# Patient Record
Sex: FEMALE | URBAN - METROPOLITAN AREA
[De-identification: names, ages, dates, MRNs, and addresses within clinical notes are randomized per-mention and may not be internally consistent; named-entity substitution may affect disease eponyms.]

---

## 2023-12-28 ENCOUNTER — APPOINTMENT (RX ONLY)
Dept: URBAN - METROPOLITAN AREA CLINIC 120 | Facility: CLINIC | Age: 33
Setting detail: DERMATOLOGY
End: 2023-12-28

## 2023-12-28 DIAGNOSIS — L66.81 CENTRAL CENTRIFUGAL CICATRICIAL ALOPECIA: ICD-10-CM

## 2023-12-28 DIAGNOSIS — L30.8 OTHER SPECIFIED DERMATITIS: ICD-10-CM

## 2023-12-28 DIAGNOSIS — L66.8 OTHER CICATRICIAL ALOPECIA: ICD-10-CM

## 2023-12-28 PROBLEM — L65.9 NONSCARRING HAIR LOSS, UNSPECIFIED: Status: ACTIVE | Noted: 2023-12-28

## 2023-12-28 PROCEDURE — 99203 OFFICE O/P NEW LOW 30 MIN: CPT

## 2023-12-28 PROCEDURE — ? DEFER

## 2023-12-28 PROCEDURE — ? ADDITIONAL NOTES

## 2023-12-28 PROCEDURE — ? COUNSELING

## 2023-12-28 PROCEDURE — ? PHOTO-DOCUMENTATION

## 2023-12-28 PROCEDURE — ? TREATMENT REGIMEN

## 2023-12-28 ASSESSMENT — LOCATION SIMPLE DESCRIPTION DERM
LOCATION SIMPLE: RIGHT HAND
LOCATION SIMPLE: POSTERIOR SCALP
LOCATION SIMPLE: LEFT SCALP
LOCATION SIMPLE: LEFT HAND
LOCATION SIMPLE: SCALP

## 2023-12-28 ASSESSMENT — LOCATION DETAILED DESCRIPTION DERM
LOCATION DETAILED: POSTERIOR MID-PARIETAL SCALP
LOCATION DETAILED: LEFT THENAR EMINENCE
LOCATION DETAILED: LEFT MEDIAL FRONTAL SCALP
LOCATION DETAILED: RIGHT SUPERIOR PARIETAL SCALP
LOCATION DETAILED: RIGHT THENAR EMINENCE

## 2023-12-28 ASSESSMENT — LOCATION ZONE DERM
LOCATION ZONE: SCALP
LOCATION ZONE: HAND

## 2023-12-28 NOTE — PROCEDURE: DEFER
X Size Of Lesion In Cm (Optional): 0
Procedure To Be Performed At Next Visit: Biopsy by punch method
Detail Level: Detailed
Introduction Text (Please End With A Colon): The following procedure was deferred: biopsy

## 2023-12-28 NOTE — PROCEDURE: ADDITIONAL NOTES
Additional Notes: Patient does not have plan for pregnancy in future currently
Render Risk Assessment In Note?: no
Detail Level: Simple

## 2024-01-04 ENCOUNTER — APPOINTMENT (RX ONLY)
Dept: URBAN - METROPOLITAN AREA CLINIC 120 | Facility: CLINIC | Age: 34
Setting detail: DERMATOLOGY
End: 2024-01-04

## 2024-01-04 DIAGNOSIS — L66.81 CENTRAL CENTRIFUGAL CICATRICIAL ALOPECIA: ICD-10-CM

## 2024-01-04 DIAGNOSIS — L66.8 OTHER CICATRICIAL ALOPECIA: ICD-10-CM

## 2024-01-04 DIAGNOSIS — L91.0 HYPERTROPHIC SCAR: ICD-10-CM

## 2024-01-04 PROBLEM — L65.9 NONSCARRING HAIR LOSS, UNSPECIFIED: Status: ACTIVE | Noted: 2024-01-04

## 2024-01-04 PROCEDURE — ? INTRALESIONAL KENALOG

## 2024-01-04 PROCEDURE — 11104 PUNCH BX SKIN SINGLE LESION: CPT

## 2024-01-04 PROCEDURE — 11900 INJECT SKIN LESIONS </W 7: CPT

## 2024-01-04 PROCEDURE — ? BIOPSY BY PUNCH METHOD

## 2024-01-04 PROCEDURE — ? COUNSELING

## 2024-01-04 PROCEDURE — ? ADDITIONAL NOTES

## 2024-01-04 ASSESSMENT — LOCATION DETAILED DESCRIPTION DERM
LOCATION DETAILED: LEFT MEDIAL FRONTAL SCALP
LOCATION DETAILED: LEFT SUPERIOR PARIETAL SCALP
LOCATION DETAILED: LEFT KNEE
LOCATION DETAILED: POSTERIOR MID-PARIETAL SCALP

## 2024-01-04 ASSESSMENT — LOCATION SIMPLE DESCRIPTION DERM
LOCATION SIMPLE: SCALP
LOCATION SIMPLE: POSTERIOR SCALP
LOCATION SIMPLE: LEFT SCALP
LOCATION SIMPLE: LEFT KNEE

## 2024-01-04 ASSESSMENT — LOCATION ZONE DERM
LOCATION ZONE: LEG
LOCATION ZONE: SCALP

## 2024-01-04 NOTE — PROCEDURE: INTRALESIONAL KENALOG
How Many Mls Were Removed From The 40 Mg/Ml (1ml) Vial When Preparing The Injectable Solution?: 0
Consent: The risks of atrophy were reviewed with the patient.
Require Ndc Code?: No
Total Volume (Ccs): 0.5
Kenalog Type Of Vial: Multiple Dose
Kenalog Preparation: Kenalog
Concentration Of Kenalog Solution Injected (Mg/Ml): 20.0
Detail Level: Detailed
Medical Necessity Clause: This procedure was medically necessary because the lesions that were treated were:
Validate Note Data When Using Inventory: Yes

## 2024-01-04 NOTE — PROCEDURE: BIOPSY BY PUNCH METHOD

## 2024-01-19 ENCOUNTER — APPOINTMENT (RX ONLY)
Dept: URBAN - METROPOLITAN AREA CLINIC 120 | Facility: CLINIC | Age: 34
Setting detail: DERMATOLOGY
End: 2024-01-19

## 2024-01-19 VITALS — HEIGHT: 65 IN | WEIGHT: 170 LBS

## 2024-01-19 DIAGNOSIS — L66.81 CENTRAL CENTRIFUGAL CICATRICIAL ALOPECIA: ICD-10-CM

## 2024-01-19 DIAGNOSIS — L66.8 OTHER CICATRICIAL ALOPECIA: ICD-10-CM

## 2024-01-19 DIAGNOSIS — Z48.02 ENCOUNTER FOR REMOVAL OF SUTURES: ICD-10-CM

## 2024-01-19 PROCEDURE — 99214 OFFICE O/P EST MOD 30 MIN: CPT

## 2024-01-19 PROCEDURE — ? PRESCRIPTION MEDICATION MANAGEMENT

## 2024-01-19 PROCEDURE — ? ORDER TESTS

## 2024-01-19 PROCEDURE — ? TREATMENT REGIMEN

## 2024-01-19 PROCEDURE — ? PRESCRIPTION

## 2024-01-19 PROCEDURE — ? COUNSELING

## 2024-01-19 RX ORDER — HYDROXYCHLOROQUINE SULFATE 200 MG/1
TABLET ORAL
Qty: 90 | Refills: 0 | Status: ERX | COMMUNITY
Start: 2024-01-19

## 2024-01-19 RX ORDER — DUTASTERIDE 0.5 MG/1
CAPSULE, LIQUID FILLED ORAL
Qty: 90 | Refills: 0 | Status: ERX | COMMUNITY
Start: 2024-01-19

## 2024-01-19 RX ORDER — CLOBETASOL PROPIONATE 0.5 MG/ML
SOLUTION TOPICAL QHS
Qty: 50 | Refills: 3 | Status: ERX | COMMUNITY
Start: 2024-01-19

## 2024-01-19 RX ADMIN — DUTASTERIDE: 0.5 CAPSULE, LIQUID FILLED ORAL at 00:00

## 2024-01-19 RX ADMIN — CLOBETASOL PROPIONATE: 0.5 SOLUTION TOPICAL at 00:00

## 2024-01-19 RX ADMIN — HYDROXYCHLOROQUINE SULFATE: 200 TABLET ORAL at 00:00

## 2024-01-19 ASSESSMENT — LOCATION SIMPLE DESCRIPTION DERM
LOCATION SIMPLE: SCALP
LOCATION SIMPLE: POSTERIOR SCALP

## 2024-01-19 ASSESSMENT — LOCATION ZONE DERM: LOCATION ZONE: SCALP

## 2024-01-19 ASSESSMENT — LOCATION DETAILED DESCRIPTION DERM
LOCATION DETAILED: POSTERIOR MID-PARIETAL SCALP
LOCATION DETAILED: RIGHT SUPERIOR PARIETAL SCALP

## 2024-01-19 NOTE — PROCEDURE: TREATMENT REGIMEN
Initiate Treatment: Location: right superior parietal scalp.\\nI reviewed the pathology results with the patient in detail.\\nThe examination of the site was clean, dry and intact. Sutures were removed.\\nbandaid applied.
Detail Level: Zone

## 2024-01-19 NOTE — PROCEDURE: PRESCRIPTION MEDICATION MANAGEMENT
Initiate Treatment: dutasteride 0.5 mg capsule \\nQuantity: 90.0 Capsule  Days Supply: 90\\nSig: Take 1 pill po qd\\n\\nPlaquenil 200 mg tablet \\nQuantity: 90.0 Tablet\\nSig: Take one pill in AM and 1/2 pill in PM\\n\\nclobetasol 0.05 % scalp solution \\nQuantity: 50.0 ml\\nSig: Apply once daily to affected areas on scalp.
Detail Level: Zone
Render In Strict Bullet Format?: No

## 2024-01-19 NOTE — PROCEDURE: ORDER TESTS
Lab Facility: [id number]
Billing Type: Third-Party Bill
Performing Laboratory: [id number]
Bill For Surgical Tray: no
Expected Date Of Service: 01/19/2024

## 2024-02-16 ENCOUNTER — APPOINTMENT (RX ONLY)
Dept: URBAN - METROPOLITAN AREA CLINIC 120 | Facility: CLINIC | Age: 34
Setting detail: DERMATOLOGY
End: 2024-02-16

## 2024-02-16 DIAGNOSIS — L66.8 OTHER CICATRICIAL ALOPECIA: ICD-10-CM

## 2024-02-16 DIAGNOSIS — L66.81 CENTRAL CENTRIFUGAL CICATRICIAL ALOPECIA: ICD-10-CM

## 2024-02-16 PROCEDURE — ? ADDITIONAL NOTES

## 2024-02-16 PROCEDURE — ? PRESCRIPTION MEDICATION MANAGEMENT

## 2024-02-16 PROCEDURE — ? COUNSELING

## 2024-02-16 PROCEDURE — ? PRESCRIPTION

## 2024-02-16 PROCEDURE — ? ORDER TESTS

## 2024-02-16 PROCEDURE — 99214 OFFICE O/P EST MOD 30 MIN: CPT

## 2024-02-16 RX ORDER — DUTASTERIDE 0.5 MG/1
CAPSULE, LIQUID FILLED ORAL
Qty: 90 | Refills: 0 | Status: ERX

## 2024-02-16 RX ORDER — CLOBETASOL PROPIONATE 0.5 MG/ML
SOLUTION TOPICAL QHS
Qty: 50 | Refills: 3 | Status: ERX

## 2024-02-16 RX ORDER — HYDROXYCHLOROQUINE SULFATE 200 MG/1
TABLET ORAL
Qty: 90 | Refills: 0 | Status: ERX

## 2024-02-16 ASSESSMENT — LOCATION DETAILED DESCRIPTION DERM: LOCATION DETAILED: POSTERIOR MID-PARIETAL SCALP

## 2024-02-16 ASSESSMENT — LOCATION SIMPLE DESCRIPTION DERM: LOCATION SIMPLE: POSTERIOR SCALP

## 2024-02-16 ASSESSMENT — LOCATION ZONE DERM: LOCATION ZONE: SCALP

## 2024-02-16 NOTE — PROCEDURE: ORDER TESTS
Bill For Surgical Tray: no
Billing Type: Third-Party Bill
Lab Facility: [id number]
Performing Laboratory: [id number]
Expected Date Of Service: 02/16/2024

## 2024-02-16 NOTE — PROCEDURE: ADDITIONAL NOTES
Detail Level: Simple
Render Risk Assessment In Note?: no
Additional Notes: Patient discussed with Dr. Jansen with risks and side effects.\\nDiscussed going to ophthalmologist soon for screening - hasn’t been in about a year.\\nDid not get labs last month but has been off plaquenil. Getting labs and recheck in 1 month.

## 2024-02-16 NOTE — PROCEDURE: PRESCRIPTION MEDICATION MANAGEMENT
Detail Level: Zone
Render In Strict Bullet Format?: No
Continue Regimen: dutasteride 0.5 mg capsule \\nQuantity: 90.0 Capsule  Days Supply: 90\\nSig: Take 1 pill po qd\\n\\nPlaquenil 200 mg tablet \\nQuantity: 90.0 Tablet\\nSig: Take one pill in AM and 1/2 pill in PM\\n\\nclobetasol 0.05 % scalp solution \\nQuantity: 50.0 ml\\nSig: Apply once daily to affected areas on scalp.

## 2024-03-21 ENCOUNTER — APPOINTMENT (RX ONLY)
Dept: URBAN - METROPOLITAN AREA CLINIC 120 | Facility: CLINIC | Age: 34
Setting detail: DERMATOLOGY
End: 2024-03-21

## 2024-03-21 ENCOUNTER — RX ONLY (OUTPATIENT)
Age: 34
Setting detail: RX ONLY
End: 2024-03-21

## 2024-03-21 DIAGNOSIS — L66.8 OTHER CICATRICIAL ALOPECIA: ICD-10-CM

## 2024-03-21 DIAGNOSIS — L66.81 CENTRAL CENTRIFUGAL CICATRICIAL ALOPECIA: ICD-10-CM

## 2024-03-21 PROCEDURE — ? ORDER TESTS

## 2024-03-21 PROCEDURE — 99214 OFFICE O/P EST MOD 30 MIN: CPT

## 2024-03-21 PROCEDURE — ? COUNSELING

## 2024-03-21 PROCEDURE — ? PRESCRIPTION MEDICATION MANAGEMENT

## 2024-03-21 RX ORDER — DUTASTERIDE 0.5 MG/1
CAPSULE, LIQUID FILLED ORAL
Qty: 90 | Refills: 0 | Status: ERX

## 2024-03-21 RX ORDER — CLOBETASOL PROPIONATE 0.5 MG/ML
SOLUTION TOPICAL QHS
Qty: 50 | Refills: 3 | Status: ERX

## 2024-03-21 RX ORDER — HYDROXYCHLOROQUINE SULFATE 200 MG/1
TABLET ORAL
Qty: 90 | Refills: 0 | Status: ERX

## 2024-03-21 ASSESSMENT — LOCATION SIMPLE DESCRIPTION DERM: LOCATION SIMPLE: POSTERIOR SCALP

## 2024-03-21 ASSESSMENT — LOCATION ZONE DERM: LOCATION ZONE: SCALP

## 2024-03-21 ASSESSMENT — LOCATION DETAILED DESCRIPTION DERM: LOCATION DETAILED: POSTERIOR MID-PARIETAL SCALP

## 2024-03-21 NOTE — PROCEDURE: ORDER TESTS
Bill For Surgical Tray: no
Billing Type: Third-Party Bill
Performing Laboratory: [id number]
Expected Date Of Service: 02/16/2024

## 2025-03-20 ENCOUNTER — RX ONLY (RX ONLY)
Age: 35
End: 2025-03-20

## 2025-03-20 ENCOUNTER — APPOINTMENT (OUTPATIENT)
Dept: URBAN - METROPOLITAN AREA CLINIC 120 | Facility: CLINIC | Age: 35
Setting detail: DERMATOLOGY
End: 2025-03-20

## 2025-03-20 DIAGNOSIS — L66.81 CENTRAL CENTRIFUGAL CICATRICIAL ALOPECIA: ICD-10-CM

## 2025-03-20 PROCEDURE — ? ORDER TESTS

## 2025-03-20 PROCEDURE — 99214 OFFICE O/P EST MOD 30 MIN: CPT

## 2025-03-20 PROCEDURE — ? PRESCRIPTION MEDICATION MANAGEMENT

## 2025-03-20 PROCEDURE — ? COUNSELING

## 2025-03-20 RX ORDER — CLOBETASOL PROPIONATE 0.5 MG/ML
SOLUTION TOPICAL QHS
Qty: 50 | Refills: 3 | Status: ERX

## 2025-03-20 RX ORDER — HYDROXYCHLOROQUINE SULFATE 200 MG/1
TABLET ORAL
Qty: 90 | Refills: 0 | Status: ACTIVE

## 2025-03-20 RX ORDER — DUTASTERIDE 0.5 MG/1
CAPSULE, LIQUID FILLED ORAL
Qty: 90 | Refills: 0 | Status: ERX

## 2025-03-20 ASSESSMENT — LOCATION SIMPLE DESCRIPTION DERM: LOCATION SIMPLE: POSTERIOR SCALP

## 2025-03-20 ASSESSMENT — LOCATION ZONE DERM: LOCATION ZONE: SCALP

## 2025-03-20 ASSESSMENT — LOCATION DETAILED DESCRIPTION DERM: LOCATION DETAILED: POSTERIOR MID-PARIETAL SCALP

## 2025-03-20 NOTE — PROCEDURE: ORDER TESTS
Bill For Surgical Tray: no
Billing Type: Third-Party Bill
Performing Laboratory: [id number]
Expected Date Of Service: 02/16/2024